# Patient Record
Sex: FEMALE | Race: WHITE | NOT HISPANIC OR LATINO | URBAN - METROPOLITAN AREA
[De-identification: names, ages, dates, MRNs, and addresses within clinical notes are randomized per-mention and may not be internally consistent; named-entity substitution may affect disease eponyms.]

---

## 2019-10-23 ENCOUNTER — ANESTHESIA EVENT (OUTPATIENT)
Dept: OBSTETRICS AND GYNECOLOGY | Facility: OTHER | Age: 29
End: 2019-10-23

## 2019-10-23 ENCOUNTER — ANESTHESIA (OUTPATIENT)
Dept: OBSTETRICS AND GYNECOLOGY | Facility: OTHER | Age: 29
End: 2019-10-23

## 2019-10-23 ENCOUNTER — HOSPITAL ENCOUNTER (OUTPATIENT)
Facility: OTHER | Age: 29
LOS: 1 days | Discharge: HOME | End: 2019-10-25
Attending: OBSTETRICS & GYNECOLOGY | Admitting: PEDIATRICS
Payer: COMMERCIAL

## 2019-10-23 DIAGNOSIS — O44.40 LOW-LYING PLACENTA: ICD-10-CM

## 2019-10-23 DIAGNOSIS — Z3A.30 30 WEEKS GESTATION OF PREGNANCY: ICD-10-CM

## 2019-10-23 DIAGNOSIS — N93.9 VAGINAL BLEEDING: Primary | ICD-10-CM

## 2019-10-23 LAB
ABO + RH BLD: NORMAL
BASOPHILS # BLD AUTO: 0.01 K/UL (ref 0–0.2)
BASOPHILS NFR BLD: 0.1 % (ref 0–1.9)
BLD GP AB SCN CELLS X3 SERPL QL: NORMAL
DIFFERENTIAL METHOD: ABNORMAL
EOSINOPHIL # BLD AUTO: 0 K/UL (ref 0–0.5)
EOSINOPHIL NFR BLD: 0 % (ref 0–8)
ERYTHROCYTE [DISTWIDTH] IN BLOOD BY AUTOMATED COUNT: 12.7 % (ref 11.5–14.5)
HCT VFR BLD AUTO: 37.5 % (ref 37–48.5)
HGB BLD-MCNC: 12.8 G/DL (ref 12–16)
HIV1+2 IGG SERPL QL IA.RAPID: NEGATIVE
IMM GRANULOCYTES # BLD AUTO: 0.06 K/UL (ref 0–0.04)
IMM GRANULOCYTES NFR BLD AUTO: 0.5 % (ref 0–0.5)
LYMPHOCYTES # BLD AUTO: 1 K/UL (ref 1–4.8)
LYMPHOCYTES NFR BLD: 8.7 % (ref 18–48)
MCH RBC QN AUTO: 31.4 PG (ref 27–31)
MCHC RBC AUTO-ENTMCNC: 34.1 G/DL (ref 32–36)
MCV RBC AUTO: 92 FL (ref 82–98)
MONOCYTES # BLD AUTO: 0.1 K/UL (ref 0.3–1)
MONOCYTES NFR BLD: 0.9 % (ref 4–15)
NEUTROPHILS # BLD AUTO: 10 K/UL (ref 1.8–7.7)
NEUTROPHILS NFR BLD: 89.8 % (ref 38–73)
NRBC BLD-RTO: 0 /100 WBC
PLATELET # BLD AUTO: 291 K/UL (ref 150–350)
PMV BLD AUTO: 10 FL (ref 9.2–12.9)
RBC # BLD AUTO: 4.07 M/UL (ref 4–5.4)
WBC # BLD AUTO: 11.13 K/UL (ref 3.9–12.7)

## 2019-10-23 PROCEDURE — 59025 FETAL NON-STRESS TEST: CPT | Mod: 26,,, | Performed by: PEDIATRICS

## 2019-10-23 PROCEDURE — 86901 BLOOD TYPING SEROLOGIC RH(D): CPT

## 2019-10-23 PROCEDURE — 86762 RUBELLA ANTIBODY: CPT

## 2019-10-23 PROCEDURE — 86703 HIV-1/HIV-2 1 RESULT ANTBDY: CPT

## 2019-10-23 PROCEDURE — 86592 SYPHILIS TEST NON-TREP QUAL: CPT

## 2019-10-23 PROCEDURE — G0378 HOSPITAL OBSERVATION PER HR: HCPCS

## 2019-10-23 PROCEDURE — 76816 PR  US,PREGNANT UTERUS,F/U,TRANSABD APP: ICD-10-PCS | Mod: 26,,, | Performed by: PEDIATRICS

## 2019-10-23 PROCEDURE — 36415 COLL VENOUS BLD VENIPUNCTURE: CPT

## 2019-10-23 PROCEDURE — 99219 PR INITIAL OBSERVATION CARE,LEVL II: CPT | Mod: 25,,, | Performed by: PEDIATRICS

## 2019-10-23 PROCEDURE — 96365 THER/PROPH/DIAG IV INF INIT: CPT | Performed by: PEDIATRICS

## 2019-10-23 PROCEDURE — 59025 PR FETAL 2N-STRESS TEST: ICD-10-PCS | Mod: 26,,, | Performed by: PEDIATRICS

## 2019-10-23 PROCEDURE — 96361 HYDRATE IV INFUSION ADD-ON: CPT | Mod: 59 | Performed by: PEDIATRICS

## 2019-10-23 PROCEDURE — 96376 TX/PRO/DX INJ SAME DRUG ADON: CPT | Performed by: PEDIATRICS

## 2019-10-23 PROCEDURE — 86920 COMPATIBILITY TEST SPIN: CPT

## 2019-10-23 PROCEDURE — 87081 CULTURE SCREEN ONLY: CPT

## 2019-10-23 PROCEDURE — 96375 TX/PRO/DX INJ NEW DRUG ADDON: CPT | Performed by: PEDIATRICS

## 2019-10-23 PROCEDURE — 99219 PR INITIAL OBSERVATION CARE,LEVL II: ICD-10-PCS | Mod: 25,,, | Performed by: PEDIATRICS

## 2019-10-23 PROCEDURE — 59025 FETAL NON-STRESS TEST: CPT

## 2019-10-23 PROCEDURE — 87340 HEPATITIS B SURFACE AG IA: CPT

## 2019-10-23 PROCEDURE — 25000003 PHARM REV CODE 250: Performed by: STUDENT IN AN ORGANIZED HEALTH CARE EDUCATION/TRAINING PROGRAM

## 2019-10-23 PROCEDURE — 85025 COMPLETE CBC W/AUTO DIFF WBC: CPT

## 2019-10-23 PROCEDURE — 63600175 PHARM REV CODE 636 W HCPCS: Performed by: STUDENT IN AN ORGANIZED HEALTH CARE EDUCATION/TRAINING PROGRAM

## 2019-10-23 PROCEDURE — 76816 OB US FOLLOW-UP PER FETUS: CPT | Mod: 26,,, | Performed by: PEDIATRICS

## 2019-10-23 PROCEDURE — 99285 EMERGENCY DEPT VISIT HI MDM: CPT | Mod: 25

## 2019-10-23 RX ORDER — CALCIUM GLUCONATE 98 MG/ML
1 INJECTION, SOLUTION INTRAVENOUS
Status: DISCONTINUED | OUTPATIENT
Start: 2019-10-23 | End: 2019-10-25 | Stop reason: HOSPADM

## 2019-10-23 RX ORDER — AMOXICILLIN 250 MG
1 CAPSULE ORAL NIGHTLY PRN
Status: DISCONTINUED | OUTPATIENT
Start: 2019-10-23 | End: 2019-10-25 | Stop reason: HOSPADM

## 2019-10-23 RX ORDER — MAGNESIUM SULFATE HEPTAHYDRATE 40 MG/ML
6 INJECTION, SOLUTION INTRAVENOUS ONCE
Status: COMPLETED | OUTPATIENT
Start: 2019-10-23 | End: 2019-10-23

## 2019-10-23 RX ORDER — DIPHENHYDRAMINE HCL 25 MG
25 CAPSULE ORAL EVERY 4 HOURS PRN
Status: DISCONTINUED | OUTPATIENT
Start: 2019-10-23 | End: 2019-10-25 | Stop reason: HOSPADM

## 2019-10-23 RX ORDER — DIPHENHYDRAMINE HYDROCHLORIDE 50 MG/ML
25 INJECTION INTRAMUSCULAR; INTRAVENOUS EVERY 4 HOURS PRN
Status: DISCONTINUED | OUTPATIENT
Start: 2019-10-23 | End: 2019-10-25 | Stop reason: HOSPADM

## 2019-10-23 RX ORDER — PRENATAL WITH FERROUS FUM AND FOLIC ACID 3080; 920; 120; 400; 22; 1.84; 3; 20; 10; 1; 12; 200; 27; 25; 2 [IU]/1; [IU]/1; MG/1; [IU]/1; MG/1; MG/1; MG/1; MG/1; MG/1; MG/1; UG/1; MG/1; MG/1; MG/1; MG/1
1 TABLET ORAL DAILY
Status: DISCONTINUED | OUTPATIENT
Start: 2019-10-23 | End: 2019-10-25 | Stop reason: HOSPADM

## 2019-10-23 RX ORDER — SIMETHICONE 80 MG
1 TABLET,CHEWABLE ORAL EVERY 6 HOURS PRN
Status: DISCONTINUED | OUTPATIENT
Start: 2019-10-23 | End: 2019-10-25 | Stop reason: HOSPADM

## 2019-10-23 RX ORDER — BETAMETHASONE SODIUM PHOSPHATE AND BETAMETHASONE ACETATE 3; 3 MG/ML; MG/ML
12 INJECTION, SUSPENSION INTRA-ARTICULAR; INTRALESIONAL; INTRAMUSCULAR; SOFT TISSUE ONCE
Status: COMPLETED | OUTPATIENT
Start: 2019-10-24 | End: 2019-10-24

## 2019-10-23 RX ORDER — ACETAMINOPHEN 500 MG
1000 TABLET ORAL ONCE
Status: DISCONTINUED | OUTPATIENT
Start: 2019-10-23 | End: 2019-10-23

## 2019-10-23 RX ORDER — ONDANSETRON 8 MG/1
8 TABLET, ORALLY DISINTEGRATING ORAL EVERY 8 HOURS PRN
Status: DISCONTINUED | OUTPATIENT
Start: 2019-10-23 | End: 2019-10-25 | Stop reason: HOSPADM

## 2019-10-23 RX ORDER — MAGNESIUM SULFATE HEPTAHYDRATE 40 MG/ML
2 INJECTION, SOLUTION INTRAVENOUS CONTINUOUS
Status: DISCONTINUED | OUTPATIENT
Start: 2019-10-23 | End: 2019-10-24

## 2019-10-23 RX ADMIN — MAGNESIUM SULFATE IN WATER 2 G/HR: 40 INJECTION, SOLUTION INTRAVENOUS at 06:10

## 2019-10-23 RX ADMIN — MAGNESIUM SULFATE HEPTAHYDRATE: 40 INJECTION, SOLUTION INTRAVENOUS at 06:10

## 2019-10-23 RX ADMIN — SODIUM CHLORIDE, SODIUM LACTATE, POTASSIUM CHLORIDE, AND CALCIUM CHLORIDE 1000 ML: .6; .31; .03; .02 INJECTION, SOLUTION INTRAVENOUS at 05:10

## 2019-10-23 RX ADMIN — Medication 5 MILLION UNITS: at 08:10

## 2019-10-23 NOTE — HOSPITAL COURSE
10/23/2019 - pt admitted for obs, BMZ course. Continuous monitoring with plans to deescalate if fetal status reassuring. [place] with increased ctx. Magnesium initiated for neuroprotection, PCN initiated for GBS unknown. GBS collected  10/24/2019 - pt with minor cramping overnight, toco with rare ctx. Had 1 episode of blood tinged fluid with small clot. ROM negative. 2nd dose of BMZ this AM. Fetal status reassuring, no further bleeding. NST BID  10/25/2019 - pt with light spotting overnight, no passage of clots/heavy bleeding. NST reactive and reassuring. Pt given strict bleeding precautions. Stable for d/c

## 2019-10-23 NOTE — ANESTHESIA PREPROCEDURE EVALUATION
Ashley Pisano is a healthy  29 y.o. female,  at 30w0d who presents with vaginal bleeding. Pt was dx with marginal previa. Bleeding is resolving.    OB History    Para Term  AB Living   1 0 0 0 0 0   SAB TAB Ectopic Multiple Live Births   0 0 0 0 0      # Outcome Date GA Lbr Brandon/2nd Weight Sex Delivery Anes PTL Lv   1 Current                Wt Readings from Last 1 Encounters:   10/23/19 1455 57.6 kg (127 lb)       BP Readings from Last 3 Encounters:   10/23/19 108/73       Patient Active Problem List   Diagnosis    Vaginal bleeding    30 weeks gestation of pregnancy       No past surgical history on file.    Social History     Socioeconomic History    Marital status:      Spouse name: Not on file    Number of children: Not on file    Years of education: Not on file    Highest education level: Not on file   Occupational History    Not on file   Social Needs    Financial resource strain: Not on file    Food insecurity:     Worry: Not on file     Inability: Not on file    Transportation needs:     Medical: Not on file     Non-medical: Not on file   Tobacco Use    Smoking status: Not on file   Substance and Sexual Activity    Alcohol use: Not on file    Drug use: Not on file    Sexual activity: Not on file   Lifestyle    Physical activity:     Days per week: Not on file     Minutes per session: Not on file    Stress: Not on file   Relationships    Social connections:     Talks on phone: Not on file     Gets together: Not on file     Attends [employer] witness service: Not on file     Active member of club or organization: Not on file     Attends meetings of clubs or organizations: Not on file     Relationship status: Not on file   Other Topics Concern    Not on file   Social History Narrative    Not on file         Chemistry    No results found for: NA, K, CL, CO2, BUN, CREATININE, GLU No results found for: CALCIUM, ALKPHOS, AST, ALT, BILITOT, ESTGFRAFRICA, EGFRNONAA         Lab  Results   Component Value Date    WBC 11.13 10/23/2019    HGB 12.8 10/23/2019    HCT 37.5 10/23/2019    MCV 92 10/23/2019     10/23/2019       No results for input(s): PT, INR, PROTIME, APTT in the last 72 hours.                                                                                                                          10/23/2019  Ashley Pisano is a 29 y.o., female.    Anesthesia Evaluation    I have reviewed the Patient Summary Reports.     I have reviewed the Medications.     Review of Systems  Anesthesia Hx:  No problems with previous Anesthesia  Denies Family Hx of Anesthesia complications.   Denies Personal Hx of Anesthesia complications.   Social:  Non-Smoker, No Alcohol Use    Hematology/Oncology:  Hematology Normal   Oncology Normal     EENT/Dental:EENT/Dental Normal   Cardiovascular:  Cardiovascular Normal     Pulmonary:  Pulmonary Normal    Renal/:  Renal/ Normal     Hepatic/GI:  Hepatic/GI Normal    Musculoskeletal:  Musculoskeletal Normal    OB/GYN/PEDS:   30w0d, vaginal bleeding Planned Vaginal Delivery  Issues with Current Pregnancy Marginal previa with vaginal bleeding   Neurological:  Neurology Normal    Endocrine:  Endocrine Normal    Dermatological:  Skin Normal    Psych:  Psychiatric Normal           Physical Exam  General:  Well nourished    Airway/Jaw/Neck:  Airway Findings: Mouth Opening: Normal Tongue: Normal  General Airway Assessment: Adult  Mallampati: I  Jaw/Neck Findings:  Neck ROM: Normal ROM     Eyes/Ears/Nose:  EYES/EARS/NOSE FINDINGS: Normal   Dental:  Dental Findings: In tact     Abdomen:  Abdomen Findings:  gravid       Mental Status:  Mental Status Findings:         Anesthesia Plan  Type of Anesthesia, risks & benefits discussed:  Anesthesia Type:  CSE, epidural, regional, spinal  Patient's Preference:   Intra-op Monitoring Plan: standard ASA monitors  Intra-op Monitoring Plan Comments:   Post Op Pain Control Plan: multimodal analgesia, IV/PO Opioids PRN  and per primary service following discharge from PACU  Post Op Pain Control Plan Comments:   Induction:    Beta Blocker:  Patient is not currently on a Beta-Blocker (No further documentation required).       Informed Consent: Patient understands risks and agrees with Anesthesia plan.  Questions answered. Anesthesia consent signed with patient.  ASA Score: 2     Day of Surgery Review of History & Physical:    H&P update referred to the provider.         Ready For Surgery From Anesthesia Perspective.

## 2019-10-23 NOTE — HPI
Ashley Pisano is a 29 y.o.  at 30w0d who presented to the [facility] for vagina bleeding. Patient in town from [place] for a medical conference. Reports history of marginal previa this pregnancy. States that she woke up this morning around 3AM to her underwear soaked with blood. Had continued heavy vaginal bleeding until 5AM. Pt initially presented to [facility] where she was given IVF and BMZ. Pt reports that a speculum exam performed at that time revealed a closed cervix with no active bleeding. On arrival to [facility] pt reports mild spotting, denies heavy bleeding, passage of large clots.

## 2019-10-23 NOTE — CARE UPDATE
MD to bedside. Pt reports reports feeling minor cramping. Denies feeling ctx, denies bleeding. Normal FM. Denies LOF    SVE: cl/th/hi    FHT: 130, mod BTBV, +accels, no decels  [place]: ctx q4-6min    - 1L bolus  - Due to increase in ctx frequency, will start mag for neuroprotection and benefit of tocolysis  - PCN for GBS unknown  - GBS collected    Chrystal Whitmore MD   OBGYN, PGY-2

## 2019-10-23 NOTE — ED PROVIDER NOTES
Encounter Date: 10/23/2019       History     Chief Complaint   Patient presents with    Vaginal Bleeding     Ashley Pisano is a 29 y.o. F at 30 weeks presents to the OB ED for vaginal bleeding. Patient is from [place] in town for a medical conference. She said this morning she woke up at 0300 to her underwear soaked with blood. She said there was gushing of blood until 0500. She went to [facility] initially where she was given IV fluids and BMZ. Speculum exam at [facility] showed closed cervix with no blood in vaginal vault - per patient. The bleeding had resolved. She then came to our OB ED - where she says she is now spotting again. She says she has occasional contractions that are not persistent.         Review of patient's allergies indicates:  No Known Allergies  No past medical history on file.  No past surgical history on file.  No family history on file.  Social History     Tobacco Use    Smoking status: Not on file   Substance Use Topics    Alcohol use: Not on file    Drug use: Not on file     Review of Systems   Constitutional: Negative for chills and fever.   HENT: Negative for facial swelling.    Eyes: Negative for visual disturbance.   Respiratory: Negative for chest tightness and shortness of breath.    Cardiovascular: Negative for chest pain.   Gastrointestinal: Positive for abdominal pain. Negative for nausea and vomiting.   Genitourinary: Positive for vaginal bleeding. Negative for dysuria and vaginal discharge.   Skin: Negative.    Neurological: Negative for headaches.   Psychiatric/Behavioral: Negative.        Physical Exam     Initial Vitals [10/23/19 1313]   BP Pulse Resp Temp SpO2   108/73 100 16 98.2 °F (36.8 °C) 100 %      MAP       --         Physical Exam    Vitals reviewed.  Constitutional: She appears well-developed and well-nourished.   HENT:   Head: Normocephalic and atraumatic.   Eyes: EOM are normal.   Neck: Normal range of motion. Neck supple.   Cardiovascular: Normal rate, regular  rhythm and normal heart sounds.   Pulmonary/Chest: No respiratory distress.   Abdominal: Soft. There is no tenderness.   Genitourinary: Vagina normal and uterus normal.   Musculoskeletal: Normal range of motion.   Neurological: She is alert and oriented to person, place, and time.   Skin: Skin is warm and dry.   Psychiatric: She has a normal mood and affect.         ED Course   Obtain Fetal nonstress test (NST)  Date/Time: 10/23/2019 1:35 PM  Performed by: Christine Humphrey MD  Authorized by: Christine Humphrey MD     Nonstress Test:     Variability:  6-25 BPM    Accelerations:  15 bpm    Baseline:  120    Contractions:  Not present  Biophysical Profile:     Nonstress Test Interpretation: reactive      Overall Impression:  Reassuring  Post-procedure:     Patient tolerance:  Patient tolerated the procedure well with no immediate complications      Labs Reviewed   CBC W/ AUTO DIFFERENTIAL   HEPATITIS B SURFACE ANTIGEN   RUBELLA ANTIBODY, IGG   RPR   RAPID HIV   TYPE & SCREEN          Imaging Results    None          Medical Decision Making:   ED Management:  NST x 20 minutes reactive and reassuring  [place] shows no contractions  Due to significant bleeding, [facility] recommends admit for observation.  Other:   I have discussed this case with another health care provider.       <> Summary of the Discussion: Dr. Mckeon              Attending Attestation:   Physician Attestation Statement for Resident:  As the supervising MD   Physician Attestation Statement: I have personally seen and examined this patient.   I agree with the above history. -:   As the supervising MD I agree with the above PE.    As the supervising MD I agree with the above treatment, course, plan, and disposition.   -:     Pt approximately 30 weeks w/ [place] - D/W Dr Wood of [facility] re: first bleed with previa/marginal previa. She has received half of course of BMZ. Would consider MgSO4 in the event that patient needs to be delivered.    Checks labs  MFM will perform TVUS to assess placental location  No active bleeding on exam, cervix closed      I was personally present during the entire procedure.  I have reviewed and agree with the residents interpretation of the following: rhythm strips.  I have reviewed the following: old records at this facility.                    ED Course as of Oct 23 1400   Wed Oct 23, 2019   1335 Admit to Antepartum for observation after 1st significant bleed.  Check T&S, CBC  To M unit for transvaginal US    [LB]   1337 2nd dose BMZ to be given 0630a on 10/24. Consider MgSO4 for prematurity.    [LB]   1351 EDC 1/1/20    [LB]   1353 SSE: dark blood in vault, no active bleeding    [LB]      ED Course User Index  [LB] Arabella Mckeon MD     Clinical Impression:       ICD-10-CM ICD-9-CM   1. Vaginal bleeding N93.9 623.8   2. 30 weeks gestation of pregnancy Z3A.30 V22.2   3. Low-lying placenta O44.40 641.10                   Christine Humphrey M.D.  OBGYN PGY1               Christine Humphrey MD  Resident  10/23/19 2376       Arabella Mckeon MD  10/23/19 0017

## 2019-10-23 NOTE — ASSESSMENT & PLAN NOTE
- pt reported h/o marginal previa  - MFM ultrasound with placenta 2.7cm from os, no PATRICK noted  - bleeding stable, H/h stable at 12.8/37.5, will hold 2u  - fetal status reassuring, continuous monitoring for now, will reassess and deescalate if fetal status remains reassuring  - received 1st dose of BMZ at 0630, will give 2nd dose tomorrow  - reg diet

## 2019-10-23 NOTE — SUBJECTIVE & OBJECTIVE
Obstetric HPI:  Patient reports irregular contractions, active fetal movement Yes loss of fluid     This pregnancy has been otherwise uncomplicated    OB History    Para Term  AB Living   1 0 0 0 0 0   SAB TAB Ectopic Multiple Live Births   0 0 0 0 0      # Outcome Date GA Lbr Brandon/2nd Weight Sex Delivery Anes PTL Lv   1 Current              No past medical history on file.  No past surgical history on file.    No medications prior to admission.       Review of patient's allergies indicates:  No Known Allergies     Family History     None        Tobacco Use    Smoking status: Not on file   Substance and Sexual Activity    Alcohol use: Not on file    Drug use: Not on file    Sexual activity: Not on file     Review of Systems   Constitutional: Negative for chills and fever.   Eyes: Negative for visual disturbance.   Respiratory: Negative for shortness of breath.    Cardiovascular: Negative for chest pain and palpitations.   Gastrointestinal: Positive for abdominal pain. Negative for constipation, diarrhea, nausea and vomiting.   Genitourinary: Positive for vaginal bleeding. Negative for vaginal discharge.   Musculoskeletal: Negative for back pain.   Integumentary:  Negative for rash.   Neurological: Negative for seizures, syncope and headaches.   Hematological: Does not bruise/bleed easily.   Psychiatric/Behavioral: Negative for depression. The patient is not nervous/anxious.       Objective:     Vital Signs (Most Recent):  Temp: 98.2 °F (36.8 °C) (10/23/19 1313)  Pulse: 100 (10/23/19 1313)  Resp: 16 (10/23/19 1313)  BP: 108/73 (10/23/19 1313)  SpO2: 100 % (10/23/19 1313) Vital Signs (24h Range):  Temp:  [98.2 °F (36.8 °C)] 98.2 °F (36.8 °C)  Pulse:  [100] 100  Resp:  [16] 16  SpO2:  [100 %] 100 %  BP: (108)/(73) 108/73     Weight: 57.6 kg (127 lb)  Body mass index is 22.5 kg/m².    FHT: 120 Cat 1 (reassuring)  TOCO: irregular    Physical Exam:   Constitutional: She is oriented to person, place, and  time. She appears well-developed and well-nourished. No distress.    HENT:   Head: Normocephalic and atraumatic.   Nose: No epistaxis.    Eyes: Conjunctivae and EOM are normal.    Neck: Normal range of motion. Neck supple. No thyromegaly present.    Cardiovascular: Normal rate and regular rhythm.     Pulmonary/Chest: Effort normal. No respiratory distress.        Abdominal: Soft. She exhibits no distension. There is no tenderness.     Genitourinary: There is bleeding (small amount of old blood in vault) in the vagina.   Genitourinary Comments: Cervix visually closed, no active bleeding from os           Musculoskeletal: Normal range of motion and moves all extremeties. She exhibits no edema or tenderness.       Neurological: She is alert and oriented to person, place, and time.    Skin: Skin is warm and dry. No rash noted.    Psychiatric: She has a normal mood and affect. Her behavior is normal.        Significant Labs:  3T pending    CBC:   Recent Labs   Lab 10/23/19  1355   WBC 11.13   RBC 4.07   HGB 12.8   HCT 37.5      MCV 92   MCH 31.4*   MCHC 34.1

## 2019-10-23 NOTE — H&P
[facility]  Obstetrics  History & Physical    Patient Name: Ashley Pisano  MRN: [id number]  Admission Date: 10/23/2019  Primary Care Provider: Primary Doctor No    Subjective:     Principal Problem:Vaginal bleeding    History of Present Illness:  Ashley Pisano is a 29 y.o.  at 30w0d who presented to the [facility] for vagina bleeding. Patient in town from [place] for a medical conference. Reports history of marginal previa this pregnancy. States that she woke up this morning around 3AM to her underwear soaked with blood. Had continued heavy vaginal bleeding until 5AM. Pt initially presented to [facility] where she was given IVF and BMZ. Pt reports that a speculum exam performed at that time revealed a closed cervix with no active bleeding. On arrival to [facility] pt reports mild spotting, denies heavy bleeding, passage of large clots.    Obstetric HPI:  Patient reports irregular contractions, active fetal movement Yes loss of fluid     This pregnancy has been otherwise uncomplicated    OB History    Para Term  AB Living   1 0 0 0 0 0   SAB TAB Ectopic Multiple Live Births   0 0 0 0 0      # Outcome Date GA Lbr Brandon/2nd Weight Sex Delivery Anes PTL Lv   1 Current              No past medical history on file.  No past surgical history on file.    No medications prior to admission.       Review of patient's allergies indicates:  No Known Allergies     Family History     None        Tobacco Use    Smoking status: Not on file   Substance and Sexual Activity    Alcohol use: Not on file    Drug use: Not on file    Sexual activity: Not on file     Review of Systems   Constitutional: Negative for chills and fever.   Eyes: Negative for visual disturbance.   Respiratory: Negative for shortness of breath.    Cardiovascular: Negative for chest pain and palpitations.   Gastrointestinal: Positive for abdominal pain. Negative for constipation, diarrhea, nausea and vomiting.   Genitourinary: Positive for  vaginal bleeding. Negative for vaginal discharge.   Musculoskeletal: Negative for back pain.   Integumentary:  Negative for rash.   Neurological: Negative for seizures, syncope and headaches.   Hematological: Does not bruise/bleed easily.   Psychiatric/Behavioral: Negative for depression. The patient is not nervous/anxious.       Objective:     Vital Signs (Most Recent):  Temp: 98.2 °F (36.8 °C) (10/23/19 1313)  Pulse: 100 (10/23/19 1313)  Resp: 16 (10/23/19 1313)  BP: 108/73 (10/23/19 1313)  SpO2: 100 % (10/23/19 1313) Vital Signs (24h Range):  Temp:  [98.2 °F (36.8 °C)] 98.2 °F (36.8 °C)  Pulse:  [100] 100  Resp:  [16] 16  SpO2:  [100 %] 100 %  BP: (108)/(73) 108/73     Weight: 57.6 kg (127 lb)  Body mass index is 22.5 kg/m².    FHT: 120 Cat 1 (reassuring)  TOCO: irregular    Physical Exam:   Constitutional: She is oriented to person, place, and time. She appears well-developed and well-nourished. No distress.    HENT:   Head: Normocephalic and atraumatic.   Nose: No epistaxis.    Eyes: Conjunctivae and EOM are normal.    Neck: Normal range of motion. Neck supple. No thyromegaly present.    Cardiovascular: Normal rate and regular rhythm.     Pulmonary/Chest: Effort normal. No respiratory distress.        Abdominal: Soft. She exhibits no distension. There is no tenderness.     Genitourinary: There is bleeding (small amount of old blood in vault) in the vagina.   Genitourinary Comments: Cervix visually closed, no active bleeding from os           Musculoskeletal: Normal range of motion and moves all extremeties. She exhibits no edema or tenderness.       Neurological: She is alert and oriented to person, place, and time.    Skin: Skin is warm and dry. No rash noted.    Psychiatric: She has a normal mood and affect. Her behavior is normal.        Significant Labs:  3T pending    CBC:   Recent Labs   Lab 10/23/19  1355   WBC 11.13   RBC 4.07   HGB 12.8   HCT 37.5      MCV 92   MCH 31.4*   MCHC 34.1      Assessment/Plan:     29 y.o. female  at 30w0d for:    * Vaginal bleeding  - pt reported h/o marginal previa  - MFM ultrasound with placenta 2.7cm from os, no PATRICK noted  - bleeding stable, H/h stable at 12.8/37.5, will hold 2u  - fetal status reassuring, continuous monitoring for now, will reassess and deescalate if fetal status remains reassuring  - received 1st dose of BMZ at 0630, will give 2nd dose tomorrow  - reg diet      30 weeks gestation of pregnancy  - PNV  - 3T labs pending      Chrystal Whitmore MD  Obstetrics  [facility]

## 2019-10-24 LAB
BASOPHILS # BLD AUTO: 0.02 K/UL (ref 0–0.2)
BASOPHILS NFR BLD: 0.1 % (ref 0–1.9)
DIFFERENTIAL METHOD: ABNORMAL
EOSINOPHIL # BLD AUTO: 0 K/UL (ref 0–0.5)
EOSINOPHIL NFR BLD: 0 % (ref 0–8)
ERYTHROCYTE [DISTWIDTH] IN BLOOD BY AUTOMATED COUNT: 12.8 % (ref 11.5–14.5)
HBV SURFACE AG SERPL QL IA: NEGATIVE
HCT VFR BLD AUTO: 30.4 % (ref 37–48.5)
HGB BLD-MCNC: 10.1 G/DL (ref 12–16)
IMM GRANULOCYTES # BLD AUTO: 0.14 K/UL (ref 0–0.04)
IMM GRANULOCYTES NFR BLD AUTO: 1 % (ref 0–0.5)
LYMPHOCYTES # BLD AUTO: 1.8 K/UL (ref 1–4.8)
LYMPHOCYTES NFR BLD: 13 % (ref 18–48)
MCH RBC QN AUTO: 31.5 PG (ref 27–31)
MCHC RBC AUTO-ENTMCNC: 33.2 G/DL (ref 32–36)
MCV RBC AUTO: 95 FL (ref 82–98)
MONOCYTES # BLD AUTO: 1.1 K/UL (ref 0.3–1)
MONOCYTES NFR BLD: 7.6 % (ref 4–15)
NEUTROPHILS # BLD AUTO: 10.9 K/UL (ref 1.8–7.7)
NEUTROPHILS NFR BLD: 78.3 % (ref 38–73)
NRBC BLD-RTO: 0 /100 WBC
PLATELET # BLD AUTO: 261 K/UL (ref 150–350)
PMV BLD AUTO: 9.6 FL (ref 9.2–12.9)
RBC # BLD AUTO: 3.21 M/UL (ref 4–5.4)
RPR SER QL: NORMAL
RUBV IGG SER-ACNC: 111 IU/ML
RUBV IGG SER-IMP: REACTIVE
WBC # BLD AUTO: 13.88 K/UL (ref 3.9–12.7)

## 2019-10-24 PROCEDURE — G0378 HOSPITAL OBSERVATION PER HR: HCPCS

## 2019-10-24 PROCEDURE — 96372 THER/PROPH/DIAG INJ SC/IM: CPT | Mod: 59 | Performed by: PEDIATRICS

## 2019-10-24 PROCEDURE — 36415 COLL VENOUS BLD VENIPUNCTURE: CPT

## 2019-10-24 PROCEDURE — 25000003 PHARM REV CODE 250: Performed by: STUDENT IN AN ORGANIZED HEALTH CARE EDUCATION/TRAINING PROGRAM

## 2019-10-24 PROCEDURE — 99225 PR SUBSEQUENT OBSERVATION CARE,LEVEL II: ICD-10-PCS | Mod: 25,,, | Performed by: PEDIATRICS

## 2019-10-24 PROCEDURE — 85025 COMPLETE CBC W/AUTO DIFF WBC: CPT

## 2019-10-24 PROCEDURE — 59025 FETAL NON-STRESS TEST: CPT | Mod: 26,,, | Performed by: PEDIATRICS

## 2019-10-24 PROCEDURE — 59025 PR FETAL 2N-STRESS TEST: ICD-10-PCS | Mod: 26,,, | Performed by: PEDIATRICS

## 2019-10-24 PROCEDURE — 63600175 PHARM REV CODE 636 W HCPCS: Performed by: STUDENT IN AN ORGANIZED HEALTH CARE EDUCATION/TRAINING PROGRAM

## 2019-10-24 PROCEDURE — 99225 PR SUBSEQUENT OBSERVATION CARE,LEVEL II: CPT | Mod: 25,,, | Performed by: PEDIATRICS

## 2019-10-24 RX ORDER — SODIUM CHLORIDE, SODIUM LACTATE, POTASSIUM CHLORIDE, CALCIUM CHLORIDE 600; 310; 30; 20 MG/100ML; MG/100ML; MG/100ML; MG/100ML
INJECTION, SOLUTION INTRAVENOUS CONTINUOUS
Status: DISCONTINUED | OUTPATIENT
Start: 2019-10-23 | End: 2019-10-25 | Stop reason: HOSPADM

## 2019-10-24 RX ADMIN — DEXTROSE 2.5 MILLION UNITS: 50 INJECTION, SOLUTION INTRAVENOUS at 12:10

## 2019-10-24 RX ADMIN — DEXTROSE 2.5 MILLION UNITS: 50 INJECTION, SOLUTION INTRAVENOUS at 10:10

## 2019-10-24 RX ADMIN — PRENATAL VIT W/ FE FUMARATE-FA TAB 27-0.8 MG 1 TABLET: 27-0.8 TAB at 10:10

## 2019-10-24 RX ADMIN — BETAMETHASONE SODIUM PHOSPHATE AND BETAMETHASONE ACETATE 12 MG: 3; 3 INJECTION, SUSPENSION INTRA-ARTICULAR; INTRALESIONAL; INTRAMUSCULAR at 06:10

## 2019-10-24 RX ADMIN — DEXTROSE 2.5 MILLION UNITS: 50 INJECTION, SOLUTION INTRAVENOUS at 04:10

## 2019-10-24 NOTE — PROGRESS NOTES
[facility]  Obstetrics  Antepartum Progress Note    Patient Name: Ashley Pisano  MRN: [id number]  Admission Date: 10/23/2019  Hospital Length of Stay: 1 days  Attending Physician: Kathie Wood MD  Primary Care Provider: Primary Doctor No    Subjective:     Principal Problem:Vaginal bleeding    HPI:  Ashley Pisano is a 29 y.o.  at 30w0d who presented to the [facility] for vagina bleeding. Patient in town from [place] for a medical conference. Reports history of marginal previa this pregnancy. States that she woke up this morning around 3AM to her underwear soaked with blood. Had continued heavy vaginal bleeding until 5AM. Pt initially presented to [facility] where she was given IVF and BMZ. Pt reports that a speculum exam performed at that time revealed a closed cervix with no active bleeding. On arrival to [facility] pt reports mild spotting, denies heavy bleeding, passage of large clots.    Hospital Course:  10/23/2019 - pt admitted for obs, BMZ course. Continuous monitoring with plans to deescalate if fetal status reassuring. [place] with increased ctx. Magnesium initiated for neuroprotection, PCN initiated for GBS unknown. GBS collected  10/24/2019 - pt with minor cramping overnight, toco with rare ctx. Had 1 episode of blood tinged fluid with small clot. ROM negative. 2nd dose of BMZ this AM    Obstetric HPI:  Pt with episode of cramping overnight, has been sleeping for the past 2 hours without feeling any ctx. Pt also reports episode of bleeding overnight with passage of a large clot. Denies continued bleeding, leakage of fluid since then. Reports good FM.     Objective:     Vital Signs (Most Recent):  Temp: 98 °F (36.7 °C) (10/24/19 0446)  Pulse: (!) 114 (10/24/19 0628)  Resp: 16 (10/23/19 1916)  BP: (!) 121/58 (10/24/19 0626)  SpO2: 100 % (10/24/19 0628) Vital Signs (24h Range):  Temp:  [97.2 °F (36.2 °C)-98.2 °F (36.8 °C)] 98 °F (36.7 °C)  Pulse:  [] 114  Resp:  [1-16] 16  SpO2:  [95 %-100 %]  100 %  BP: ()/(53-80) 121/58     Weight: 57.6 kg (127 lb)  Body mass index is 22.5 kg/m².    FHT: 120 Cat 1 (reassuring), mod BTBV, +accels, no decels  TOCO: none, some irritability    No intake or output data in the 24 hours ending 10/24/19 0648    Cervical Exam: deferred     Significant Labs:  Recent Lab Results       10/23/19  1356   10/23/19  1355        Unit Blood Type Code   6200[P]        6200[P]     Unit Expiration   [id number][P]        [id number][P]     Unit Blood Type   A POS[P]        A POS[P]     Baso #   0.01     Basophil%   0.1     CODING SYSTEM   VZJA361[P]        JFYN920[P]     Differential Method   Automated     DISPENSE STATUS   CROSSMATCHED[P]        CROSSMATCHED[P]     Eos #   0.0     Eosinophil%   0.0     Gran # (ANC)   10.0     Gran%   89.8     Group & Rh   A POS     Hematocrit   37.5     Hemoglobin   12.8     HIV Rapid Testing Negative       Immature Grans (Abs)   0.06  Comment:  Mild elevation in immature granulocytes is non specific and   can be seen in a variety of conditions including stress response,   acute inflammation, trauma and pregnancy. Correlation with other   laboratory and clinical findings is essential.       Immature Granulocytes   0.5     INDIRECT CRUZITO   NEG     Lymph #   1.0     Lymph%   8.7     MCH   31.4     MCHC   34.1     MCV   92     Mono #   0.1     Mono%   0.9     MPV   10.0     nRBC   0     Platelets   291     Product Code   E5789K28[P]        M5297I73[P]     RBC   4.07     RDW   12.7     UNIT NUMBER   [id number][P]        [id number][P]     WBC   11.13           Physical Exam:   Constitutional: She is oriented to person, place, and time. She appears well-developed and well-nourished. No distress.    HENT:   Head: Normocephalic and atraumatic.    Eyes: Conjunctivae and EOM are normal.    Neck: Normal range of motion. Neck supple. No thyromegaly present.    Cardiovascular: Normal rate and regular rhythm.     Pulmonary/Chest: Effort normal. No  respiratory distress.        Abdominal: Soft. She exhibits no distension. There is no tenderness.             Musculoskeletal: Normal range of motion and moves all extremeties. She exhibits no edema or tenderness.       Neurological: She is alert and oriented to person, place, and time.    Skin: Skin is warm and dry. No rash noted.    Psychiatric: She has a normal mood and affect. Her behavior is normal.       Assessment/Plan:     29 y.o. female  at 30w1d for:    * Vaginal bleeding  - pt reported h/o marginal previa  - MFM ultrasound with placenta 2.7cm from os, no PATRICK noted  - bleeding stable, H/h stable at 12.8/37.5, will hold 2u  - received 2nd dose of BMZ this AM  - magnesium initiated yesterday afternoon secondary to increase ctx on toco, PCN initiated for GBS unknown  - GBS collected  - overnight pt with 1 episode of bleeding, denies continued bleeding since then  - toco without ctx this AM, fetal status reassuring  - continuous fetal monitoring      30 weeks gestation of pregnancy  - PNV  - 3T labs pending        Chrystal Whitmore MD  Obstetrics  [facility]

## 2019-10-24 NOTE — ASSESSMENT & PLAN NOTE
- pt reported h/o marginal previa  - MFM ultrasound with placenta 2.7cm from os, no PATRICK noted  - bleeding stable, H/h stable at 12.8/37.5, will hold 2u  - received 2nd dose of BMZ this AM  - magnesium initiated yesterday afternoon secondary to increase ctx on toco, PCN initiated for GBS unknown  - GBS collected  - overnight pt with 1 episode of bleeding, denies continued bleeding since then  - toco without ctx this AM, fetal status reassuring  - continuous fetal monitoring

## 2019-10-24 NOTE — PROGRESS NOTES
MD called to bedside to reassess bleeding.    Nursing staff showed MD two menstrual pads. approx 30% soaked with what appears to be blood tinged fluid. Per report, pt is having blood tinged urine when using the bathroom as well. Reports that the bleeding had stopped for a few hours but had felt like it picked up over the past few hours.    Does report some abdominal tightening but no painful contractions. FHT reactive/reassuring, CAT 1     Speculum exam performed:  50 cc clot removed from vagina  Small amount of blood noted in vault, sample collected and applied to slide. Under microscope, no ferning - only RBCs    Cervix 0/50/-3    Continue to closely monitor.  Continue MgSO4 and PCN.    Peyman Cruz MD  PGY3, OBGYN [facility]

## 2019-10-24 NOTE — NURSING
Pt called out c/o blood on her pad when she got up to use the bedside commode. Changed pad and underwear. Dr. Yates notified, ordered to assess new pad in 30 minutes and call if pad is saturated. Pt educated on bleeding precautions. Will continue to monitor and assess.

## 2019-10-24 NOTE — SUBJECTIVE & OBJECTIVE
Obstetric HPI:  Pt with episode of cramping overnight, has been sleeping for the past 2 hours without feeling any ctx. Pt also reports episode of bleeding overnight with passage of a large clot. Denies continued bleeding, leakage of fluid since then. Reports good FM.     Objective:     Vital Signs (Most Recent):  Temp: 98 °F (36.7 °C) (10/24/19 0446)  Pulse: (!) 114 (10/24/19 0628)  Resp: 16 (10/23/19 1916)  BP: (!) 121/58 (10/24/19 0626)  SpO2: 100 % (10/24/19 0628) Vital Signs (24h Range):  Temp:  [97.2 °F (36.2 °C)-98.2 °F (36.8 °C)] 98 °F (36.7 °C)  Pulse:  [] 114  Resp:  [1-16] 16  SpO2:  [95 %-100 %] 100 %  BP: ()/(53-80) 121/58     Weight: 57.6 kg (127 lb)  Body mass index is 22.5 kg/m².    FHT: 120 Cat 1 (reassuring), mod BTBV, +accels, no decels  TOCO: none, some irritability    No intake or output data in the 24 hours ending 10/24/19 0648    Cervical Exam: deferred     Significant Labs:  Recent Lab Results       10/23/19  1356   10/23/19  1355        Unit Blood Type Code   6200[P]        6200[P]     Unit Expiration   [id number][P]        [id number][P]     Unit Blood Type   A POS[P]        A POS[P]     Baso #   0.01     Basophil%   0.1     CODING SYSTEM   PHLU487[P]        OTSY713[P]     Differential Method   Automated     DISPENSE STATUS   CROSSMATCHED[P]        CROSSMATCHED[P]     Eos #   0.0     Eosinophil%   0.0     Gran # (ANC)   10.0     Gran%   89.8     Group & Rh   A POS     Hematocrit   37.5     Hemoglobin   12.8     HIV Rapid Testing Negative       Immature Grans (Abs)   0.06  Comment:  Mild elevation in immature granulocytes is non specific and   can be seen in a variety of conditions including stress response,   acute inflammation, trauma and pregnancy. Correlation with other   laboratory and clinical findings is essential.       Immature Granulocytes   0.5     INDIRECT CRUZITO   NEG     Lymph #   1.0     Lymph%   8.7     MCH   31.4     MCHC   34.1     MCV   92     Mono #   0.1      Mono%   0.9     MPV   10.0     nRBC   0     Platelets   291     Product Code   A9364C36[P]        [id number][P]     RBC   4.07     RDW   12.7     UNIT NUMBER   [id number][P]        [id number][P]     WBC   11.13           Physical Exam:   Constitutional: She is oriented to person, place, and time. She appears well-developed and well-nourished. No distress.    HENT:   Head: Normocephalic and atraumatic.    Eyes: Conjunctivae and EOM are normal.    Neck: Normal range of motion. Neck supple. No thyromegaly present.    Cardiovascular: Normal rate and regular rhythm.     Pulmonary/Chest: Effort normal. No respiratory distress.        Abdominal: Soft. She exhibits no distension. There is no tenderness.             Musculoskeletal: Normal range of motion and moves all extremeties. She exhibits no edema or tenderness.       Neurological: She is alert and oriented to person, place, and time.    Skin: Skin is warm and dry. No rash noted.    Psychiatric: She has a normal mood and affect. Her behavior is normal.

## 2019-10-24 NOTE — CARE UPDATE
MD to bedside. Pt says that her and her  would like leave to stay in their hotel tonight and fly back to [place] tomorrow. Counseled pt on recommendation of remaining inpatient tonight, will revisit discharge tomorrow pending events of tonight. Pt amenable to plan and very understanding.     FHT: 130, mod BTBV, +accels, no decels, cat 1  toco with no ctx    - will d/c continuous monitoring for now, FHT reassuring, no ctx  - counseled pt to call out with any ctx, cramping, bleeding  - will reassess tomorrow AM    Chrystal Whitmore MD   OBGYN, PGY-2

## 2019-10-25 VITALS
OXYGEN SATURATION: 98 % | BODY MASS INDEX: 22.5 KG/M2 | HEART RATE: 78 BPM | DIASTOLIC BLOOD PRESSURE: 52 MMHG | HEIGHT: 63 IN | SYSTOLIC BLOOD PRESSURE: 93 MMHG | RESPIRATION RATE: 16 BRPM | TEMPERATURE: 99 F | WEIGHT: 127 LBS

## 2019-10-25 PROCEDURE — 59025 PR FETAL 2N-STRESS TEST: ICD-10-PCS | Mod: 26,,, | Performed by: PEDIATRICS

## 2019-10-25 PROCEDURE — 99217 PR OBSERVATION CARE DISCHARGE: ICD-10-PCS | Mod: 25,,, | Performed by: PEDIATRICS

## 2019-10-25 PROCEDURE — 25000003 PHARM REV CODE 250: Performed by: STUDENT IN AN ORGANIZED HEALTH CARE EDUCATION/TRAINING PROGRAM

## 2019-10-25 PROCEDURE — 59025 FETAL NON-STRESS TEST: CPT | Mod: 26,,, | Performed by: PEDIATRICS

## 2019-10-25 PROCEDURE — G0378 HOSPITAL OBSERVATION PER HR: HCPCS

## 2019-10-25 PROCEDURE — 99217 PR OBSERVATION CARE DISCHARGE: CPT | Mod: 25,,, | Performed by: PEDIATRICS

## 2019-10-25 RX ADMIN — PRENATAL VIT W/ FE FUMARATE-FA TAB 27-0.8 MG 1 TABLET: 27-0.8 TAB at 10:10

## 2019-10-25 NOTE — ASSESSMENT & PLAN NOTE
- pt reported h/o marginal previa  - MFM ultrasound with placenta 2.7cm from os, no PATRICK noted  - bleeding stable, H/h stable at 12.8/37.5, will hold 2u  - received 2nd dose of BMZ this AM  - magnesium initiated yesterday afternoon secondary to increase ctx on toco, PCN initiated for GBS unknown  - GBS prelim negative  - pt with spotting overnight, no heavy bleeding/passage of clots  - NST reactive and reassuring  - plan for d/c this AM    - Pt from [place], traveling back home today. Pt extensively counseled on potential risk of bleeding while traveling and limitations to medical care. Extensively counseled to return to ED if bleeding increases prior to initiation of traveling today. Primary OB in hometown aware of patient's situation, will follow up next week unless earlier reason to present to ED. Pt and  voiced understanding.

## 2019-10-25 NOTE — DISCHARGE INSTRUCTIONS
Call clinic [phone] or L & D after hours at [phone] for vaginal bleeding, leakage of fluids, contractions 4-5 in one hour, decreased fetal movements ( 10 kicks in 2 hours), headache not relieved by Tylenol, blurry vision, or temp of 100.4 or greater.  Begin doing fetal kick counts, at least 10 movements in 2 hours starting at 28 weeks gestation.  Keep next clinic appointment

## 2019-10-25 NOTE — SUBJECTIVE & OBJECTIVE
Obstetric HPI:  Patient reports light spotting overnight. Denies heavy vaginal bleeding, passage of clots. Denies ctx, LOF.      Objective:     Vital Signs (Most Recent):  Temp: 98.7 °F (37.1 °C) (10/25/19 0802)  Pulse: 78 (10/25/19 0802)  Resp: 16 (10/25/19 0802)  BP: (!) 93/52 (10/25/19 0802)  SpO2: 98 % (10/25/19 0802) Vital Signs (24h Range):  Temp:  [97 °F (36.1 °C)-98.7 °F (37.1 °C)] 98.7 °F (37.1 °C)  Pulse:  [78-94] 78  Resp:  [16-18] 16  SpO2:  [96 %-98 %] 98 %  BP: ()/(52-70) 93/52     Weight: 57.6 kg (127 lb)  Body mass index is 22.5 kg/m².    FHT: 125 Cat 1 (reassuring), mod BTBV, +accels, no decels  TOCO: irreg ctx    No intake or output data in the 24 hours ending 10/25/19 1348    Cervical Exam: deferred     Significant Labs:  Recent Lab Results     None          Physical Exam:   Constitutional: She is oriented to person, place, and time. She appears well-developed and well-nourished. No distress.    HENT:   Head: Normocephalic and atraumatic.    Eyes: Conjunctivae and EOM are normal.    Neck: Normal range of motion. Neck supple. No thyromegaly present.    Cardiovascular: Normal rate and regular rhythm.     Pulmonary/Chest: Effort normal. No respiratory distress.        Abdominal: Soft. She exhibits no distension. There is no tenderness.             Musculoskeletal: Normal range of motion and moves all extremeties. She exhibits no edema or tenderness.       Neurological: She is alert and oriented to person, place, and time.    Skin: Skin is warm and dry. No rash noted.    Psychiatric: She has a normal mood and affect. Her behavior is normal.

## 2019-10-25 NOTE — DISCHARGE SUMMARY
[facility]  Obstetrics  Discharge Summary      Patient Name: Ashley Pisano  MRN: [id number]  Admission Date: 10/23/2019  Hospital Length of Stay: 1 days  Discharge Date and Time:  10/25/2019 1:37 PM  Attending Physician: No att. providers found   Discharging Provider: Chrystal Whitmore MD   Primary Care Provider: Primary Doctor Gloria    HPI: Ashley Pisano is a 29 y.o.  at 30w0d who presented to the [facility] for vagina bleeding. Patient in town from [place] for a medical conference. Reports history of marginal previa this pregnancy. States that she woke up this morning around 3AM to her underwear soaked with blood. Had continued heavy vaginal bleeding until 5AM. Pt initially presented to [facility] where she was given IVF and BMZ. Pt reports that a speculum exam performed at that time revealed a closed cervix with no active bleeding. On arrival to [facility] pt reports mild spotting, denies heavy bleeding, passage of large clots.    FHT: 125 Cat 1 (reassuring), mod BTBV, +accels, no decels  TOCO: irreg ctx    * No surgery found *     Hospital Course:   10/23/2019 - pt admitted for obs, BMZ course. Continuous monitoring with plans to deescalate if fetal status reassuring. [place] with increased ctx. Magnesium initiated for neuroprotection, PCN initiated for GBS unknown. GBS collected  10/24/2019 - pt with minor cramping overnight, toco with rare ctx. Had 1 episode of blood tinged fluid with small clot. ROM negative. 2nd dose of BMZ this AM. Fetal status reassuring, no further bleeding. NST BID  10/25/2019 - pt with light spotting overnight, no passage of clots/heavy bleeding. NST reactive and reassuring. Pt given strict bleeding precautions. Stable for d/c         Final Active Diagnoses:    Diagnosis Date Noted POA    PRINCIPAL PROBLEM:  Vaginal bleeding [N93.9] 10/23/2019 Yes    30 weeks gestation of pregnancy [Z3A.30] 10/23/2019 Not Applicable      Problems Resolved During this Admission:        Labs:   CBC    Recent Labs   Lab 10/23/19  1355 10/24/19  0625   WBC 11.13 13.88*   HGB 12.8 10.1*   HCT 37.5 30.4*    261       Immunizations     None          This patient has no babies on file.  Pending Diagnostic Studies:     None          Discharged Condition: good    Disposition: Home or Self Care    Follow Up: with primary OB    Patient Instructions:      Notify your health care provider if you experience any of the following:   Order Comments: Heavy vaginal bleeding     Notify your health care provider if you experience any of the following:  increased confusion or weakness     Notify your health care provider if you experience any of the following:  persistent dizziness, light-headedness, or visual disturbances     Notify your health care provider if you experience any of the following:  severe persistent headache     Notify your health care provider if you experience any of the following:  difficulty breathing or increased cough     Notify your health care provider if you experience any of the following:  severe uncontrolled pain     Notify your health care provider if you experience any of the following:  persistent nausea and vomiting or diarrhea     Notify your health care provider if you experience any of the following:  temperature >100.4     Activity as tolerated     Medications:  There are no discharge medications for this patient.      Chrystal Whitmore MD  Obstetrics  [facility]

## 2019-10-25 NOTE — SUBJECTIVE & OBJECTIVE
Obstetric HPI:  Pt with light spotting overnight, no heavy bleeding/passage of clots. Pt denies ctx, leakage of fluid since then. Reports good FM.     Objective:     Vital Signs (Most Recent):  Temp: 98.7 °F (37.1 °C) (10/25/19 0802)  Pulse: 78 (10/25/19 0802)  Resp: 16 (10/25/19 0802)  BP: (!) 93/52 (10/25/19 0802)  SpO2: 98 % (10/25/19 0802) Vital Signs (24h Range):  Temp:  [97 °F (36.1 °C)-98.7 °F (37.1 °C)] 98.7 °F (37.1 °C)  Pulse:  [] 78  Resp:  [16-18] 16  SpO2:  [96 %-100 %] 98 %  BP: ()/(52-71) 93/52     Weight: 57.6 kg (127 lb)  Body mass index is 22.5 kg/m².    FHT: 135 Cat 1 (reassuring), mod BTBV, +accels, no decels  TOCO: none      Intake/Output Summary (Last 24 hours) at 10/25/2019 0825  Last data filed at 10/24/2019 1100  Gross per 24 hour   Intake 1772.5 ml   Output 850 ml   Net 922.5 ml       Cervical Exam: deferred     Significant Labs:  Recent Lab Results     None          Physical Exam:   Constitutional: She is oriented to person, place, and time. She appears well-developed and well-nourished. No distress.    HENT:   Head: Normocephalic and atraumatic.    Eyes: Conjunctivae and EOM are normal.    Neck: Normal range of motion. Neck supple. No thyromegaly present.    Cardiovascular: Normal rate and regular rhythm.     Pulmonary/Chest: Effort normal. No respiratory distress.        Abdominal: Soft. She exhibits no distension. There is no tenderness.             Musculoskeletal: Normal range of motion and moves all extremeties. She exhibits no edema or tenderness.       Neurological: She is alert and oriented to person, place, and time.    Skin: Skin is warm and dry. No rash noted.    Psychiatric: She has a normal mood and affect. Her behavior is normal.

## 2019-10-25 NOTE — PLAN OF CARE
Pt reports no vaginal bleeding this morning. Pt reports +FM. Pt reports irregular ctx. Pt denies LOF. Pt reports 0/10 pain. VSS. Discharge instructions reviewed with pt. Pt denies any questions at this time. Pt discharged home to self care with .

## 2019-10-25 NOTE — PROGRESS NOTES
[facility]  Obstetrics  Antepartum Progress Note    Patient Name: Ashley Pisano  MRN: [id number]  Admission Date: 10/23/2019  Hospital Length of Stay: 1 days  Attending Physician: No att. providers found  Primary Care Provider: Primary Doctor No    Subjective:     Principal Problem:Vaginal bleeding    HPI:  Ashley Pisano is a 29 y.o.  at 30w0d who presented to the [facility] for vagina bleeding. Patient in town from [place] for a medical conference. Reports history of marginal previa this pregnancy. States that she woke up this morning around 3AM to her underwear soaked with blood. Had continued heavy vaginal bleeding until 5AM. Pt initially presented to [facility] where she was given IVF and BMZ. Pt reports that a speculum exam performed at that time revealed a closed cervix with no active bleeding. On arrival to [facility] pt reports mild spotting, denies heavy bleeding, passage of large clots.    Hospital Course:  10/23/2019 - pt admitted for obs, BMZ course. Continuous monitoring with plans to deescalate if fetal status reassuring. [place] with increased ctx. Magnesium initiated for neuroprotection, PCN initiated for GBS unknown. GBS collected  10/24/2019 - pt with minor cramping overnight, toco with rare ctx. Had 1 episode of blood tinged fluid with small clot. ROM negative. 2nd dose of BMZ this AM. Fetal status reassuring, no further bleeding. NST BID  10/25/2019 - pt with light spotting overnight, no passage of clots/heavy bleeding. NST reactive and reassuring. Pt given strict bleeding precautions. Stable for d/c    Obstetric HPI:  Patient reports light spotting overnight. Denies heavy vaginal bleeding, passage of clots. Denies ctx, LOF.      Objective:     Vital Signs (Most Recent):  Temp: 98.7 °F (37.1 °C) (10/25/19 0802)  Pulse: 78 (10/25/19 0802)  Resp: 16 (10/25/19 0802)  BP: (!) 93/52 (10/25/19 0802)  SpO2: 98 % (10/25/19 0802) Vital Signs (24h Range):  Temp:  [97 °F (36.1 °C)-98.7 °F (37.1 °C)] 98.7  °F (37.1 °C)  Pulse:  [78-94] 78  Resp:  [16-18] 16  SpO2:  [96 %-98 %] 98 %  BP: ()/(52-70) 93/52     Weight: 57.6 kg (127 lb)  Body mass index is 22.5 kg/m².    FHT: 125 Cat 1 (reassuring), mod BTBV, +accels, no decels  TOCO: irreg ctx    No intake or output data in the 24 hours ending 10/25/19 1348    Cervical Exam: deferred     Significant Labs:  Recent Lab Results     None          Physical Exam:   Constitutional: She is oriented to person, place, and time. She appears well-developed and well-nourished. No distress.    HENT:   Head: Normocephalic and atraumatic.    Eyes: Conjunctivae and EOM are normal.    Neck: Normal range of motion. Neck supple. No thyromegaly present.    Cardiovascular: Normal rate and regular rhythm.     Pulmonary/Chest: Effort normal. No respiratory distress.        Abdominal: Soft. She exhibits no distension. There is no tenderness.             Musculoskeletal: Normal range of motion and moves all extremeties. She exhibits no edema or tenderness.       Neurological: She is alert and oriented to person, place, and time.    Skin: Skin is warm and dry. No rash noted.    Psychiatric: She has a normal mood and affect. Her behavior is normal.       Assessment/Plan:     29 y.o. female  at 30w2d for:    * Vaginal bleeding  - pt reported h/o marginal previa  - MFM ultrasound with placenta 2.7cm from os, no PATRICK noted  - bleeding stable, H/h stable at 12.8/37.5, will hold 2u  - received 2nd dose of BMZ this AM  - magnesium initiated yesterday afternoon secondary to increase ctx on toco, PCN initiated for GBS unknown  - GBS prelim negative  - pt with spotting overnight, no heavy bleeding/passage of clots  - NST reactive and reassuring  - plan for d/c this AM    - Pt from [place], traveling back home today. Pt extensively counseled on potential risk of bleeding while traveling and limitations to medical care. Extensively counseled to return to ED if bleeding increases prior to initiation  of traveling today. Primary OB in hometown aware of patient's situation, will follow up next week unless earlier reason to present to ED. Pt and  voiced understanding.      30 weeks gestation of pregnancy  - PNV  - 3T labs pending          Chrystal Whitmore MD  Obstetrics  [facility]

## 2019-10-26 LAB — BACTERIA SPEC AEROBE CULT: NORMAL

## 2019-10-27 LAB
BLD PROD TYP BPU: NORMAL
BLD PROD TYP BPU: NORMAL
BLOOD UNIT EXPIRATION DATE: NORMAL
BLOOD UNIT EXPIRATION DATE: NORMAL
BLOOD UNIT TYPE CODE: 6200
BLOOD UNIT TYPE CODE: 6200
BLOOD UNIT TYPE: NORMAL
BLOOD UNIT TYPE: NORMAL
CODING SYSTEM: NORMAL
CODING SYSTEM: NORMAL
DISPENSE STATUS: NORMAL
DISPENSE STATUS: NORMAL
TRANS ERYTHROCYTES VOL PATIENT: NORMAL ML
TRANS ERYTHROCYTES VOL PATIENT: NORMAL ML

## 2020-09-28 PROBLEM — Z3A.30 30 WEEKS GESTATION OF PREGNANCY: Status: RESOLVED | Noted: 2019-10-23 | Resolved: 2020-09-28

## 2023-10-31 ENCOUNTER — APPOINTMENT (OUTPATIENT)
Dept: URBAN - METROPOLITAN AREA CLINIC 251 | Age: 33
Setting detail: DERMATOLOGY
End: 2023-10-31

## 2023-10-31 DIAGNOSIS — D22 MELANOCYTIC NEVI: ICD-10-CM

## 2023-10-31 DIAGNOSIS — L85.3 XEROSIS CUTIS: ICD-10-CM

## 2023-10-31 DIAGNOSIS — Z71.89 OTHER SPECIFIED COUNSELING: ICD-10-CM

## 2023-10-31 DIAGNOSIS — D18.0 HEMANGIOMA: ICD-10-CM

## 2023-10-31 DIAGNOSIS — L82.1 OTHER SEBORRHEIC KERATOSIS: ICD-10-CM

## 2023-10-31 DIAGNOSIS — L57.8 OTHER SKIN CHANGES DUE TO CHRONIC EXPOSURE TO NONIONIZING RADIATION: ICD-10-CM

## 2023-10-31 PROBLEM — D18.01 HEMANGIOMA OF SKIN AND SUBCUTANEOUS TISSUE: Status: ACTIVE | Noted: 2023-10-31

## 2023-10-31 PROBLEM — D22.5 MELANOCYTIC NEVI OF TRUNK: Status: ACTIVE | Noted: 2023-10-31

## 2023-10-31 PROCEDURE — 99213 OFFICE O/P EST LOW 20 MIN: CPT

## 2023-10-31 PROCEDURE — OTHER MIPS QUALITY: OTHER

## 2023-10-31 PROCEDURE — OTHER SUNSCREEN RECOMMENDATIONS: OTHER

## 2023-10-31 PROCEDURE — OTHER COUNSELING: OTHER

## 2023-10-31 ASSESSMENT — LOCATION ZONE DERM
LOCATION ZONE: HAND
LOCATION ZONE: TRUNK
LOCATION ZONE: FACE

## 2023-10-31 ASSESSMENT — LOCATION SIMPLE DESCRIPTION DERM
LOCATION SIMPLE: UPPER BACK
LOCATION SIMPLE: CHEST
LOCATION SIMPLE: ABDOMEN
LOCATION SIMPLE: LEFT CHEEK
LOCATION SIMPLE: RIGHT HAND
LOCATION SIMPLE: LEFT HAND
LOCATION SIMPLE: RIGHT CHEEK

## 2023-10-31 ASSESSMENT — LOCATION DETAILED DESCRIPTION DERM
LOCATION DETAILED: RIGHT RADIAL DORSAL HAND
LOCATION DETAILED: EPIGASTRIC SKIN
LOCATION DETAILED: LOWER STERNUM
LOCATION DETAILED: SUPERIOR THORACIC SPINE
LOCATION DETAILED: LEFT CENTRAL MALAR CHEEK
LOCATION DETAILED: LEFT ULNAR DORSAL HAND
LOCATION DETAILED: RIGHT CENTRAL MALAR CHEEK
LOCATION DETAILED: INFERIOR THORACIC SPINE

## 2023-10-31 NOTE — PROCEDURE: SUNSCREEN RECOMMENDATIONS
Detail Level: Detailed
General Sunscreen Counseling: I recommended a broad spectrum sunscreen with a SPF of 30 or higher, with reapplication at least 15 minutes prior to expected sun exposure and then every 2 hours after that as long as sun exposure continues. If swimming or exercising sunscreen should be reapplied every 45 minutes to an hour after getting wet or sweating.  One ounce, or the equivalent of a shot glass full of sunscreen, is adequate to protect the skin not covered by a bathing suit. Suggested SPF lip balm and other sun protective measures as well.

## 2024-11-04 ENCOUNTER — APPOINTMENT (OUTPATIENT)
Dept: URBAN - METROPOLITAN AREA CLINIC 251 | Age: 34
Setting detail: DERMATOLOGY
End: 2024-11-06

## 2024-11-04 DIAGNOSIS — L57.8 OTHER SKIN CHANGES DUE TO CHRONIC EXPOSURE TO NONIONIZING RADIATION: ICD-10-CM

## 2024-11-04 DIAGNOSIS — L82.1 OTHER SEBORRHEIC KERATOSIS: ICD-10-CM

## 2024-11-04 DIAGNOSIS — L85.3 XEROSIS CUTIS: ICD-10-CM

## 2024-11-04 DIAGNOSIS — Z71.89 OTHER SPECIFIED COUNSELING: ICD-10-CM

## 2024-11-04 DIAGNOSIS — D18.0 HEMANGIOMA: ICD-10-CM

## 2024-11-04 DIAGNOSIS — D22 MELANOCYTIC NEVI: ICD-10-CM

## 2024-11-04 PROBLEM — D18.01 HEMANGIOMA OF SKIN AND SUBCUTANEOUS TISSUE: Status: ACTIVE | Noted: 2024-11-04

## 2024-11-04 PROBLEM — D22.5 MELANOCYTIC NEVI OF TRUNK: Status: ACTIVE | Noted: 2024-11-04

## 2024-11-04 PROCEDURE — 99213 OFFICE O/P EST LOW 20 MIN: CPT

## 2024-11-04 PROCEDURE — OTHER SUNSCREEN RECOMMENDATIONS: OTHER

## 2024-11-04 PROCEDURE — OTHER COUNSELING: OTHER

## 2024-11-04 ASSESSMENT — LOCATION SIMPLE DESCRIPTION DERM
LOCATION SIMPLE: LEFT HAND
LOCATION SIMPLE: LEFT CHEEK
LOCATION SIMPLE: ABDOMEN
LOCATION SIMPLE: RIGHT HAND
LOCATION SIMPLE: UPPER BACK
LOCATION SIMPLE: RIGHT CHEEK

## 2024-11-04 ASSESSMENT — LOCATION ZONE DERM
LOCATION ZONE: HAND
LOCATION ZONE: TRUNK
LOCATION ZONE: FACE

## 2024-11-04 ASSESSMENT — LOCATION DETAILED DESCRIPTION DERM
LOCATION DETAILED: INFERIOR THORACIC SPINE
LOCATION DETAILED: RIGHT CENTRAL MALAR CHEEK
LOCATION DETAILED: LEFT CENTRAL MALAR CHEEK
LOCATION DETAILED: RIGHT RADIAL DORSAL HAND
LOCATION DETAILED: SUPERIOR THORACIC SPINE
LOCATION DETAILED: EPIGASTRIC SKIN
LOCATION DETAILED: LEFT ULNAR DORSAL HAND